# Patient Record
Sex: FEMALE | Race: WHITE | Employment: FULL TIME | URBAN - METROPOLITAN AREA
[De-identification: names, ages, dates, MRNs, and addresses within clinical notes are randomized per-mention and may not be internally consistent; named-entity substitution may affect disease eponyms.]

---

## 2020-01-03 ENCOUNTER — HOSPITAL ENCOUNTER (EMERGENCY)
Age: 30
Discharge: HOME | End: 2020-01-04
Attending: EMERGENCY MEDICINE
Payer: COMMERCIAL

## 2020-01-03 PROCEDURE — 99283 EMERGENCY DEPT VISIT LOW MDM: CPT

## 2020-01-03 RX ORDER — 0.9 % SODIUM CHLORIDE 0.9 %
1000 INTRAVENOUS SOLUTION INTRAVENOUS ONCE
Status: DISCONTINUED | OUTPATIENT
Start: 2020-01-03 | End: 2020-01-04 | Stop reason: HOSPADM

## 2020-01-03 RX ORDER — METOCLOPRAMIDE HYDROCHLORIDE 5 MG/ML
10 INJECTION INTRAMUSCULAR; INTRAVENOUS ONCE
Status: DISCONTINUED | OUTPATIENT
Start: 2020-01-03 | End: 2020-01-03

## 2020-01-03 RX ORDER — DIPHENHYDRAMINE HYDROCHLORIDE 50 MG/ML
50 INJECTION INTRAMUSCULAR; INTRAVENOUS ONCE
Status: DISCONTINUED | OUTPATIENT
Start: 2020-01-03 | End: 2020-01-03

## 2020-01-03 RX ORDER — ACETAMINOPHEN 500 MG
1000 TABLET ORAL ONCE
Status: DISCONTINUED | OUTPATIENT
Start: 2020-01-03 | End: 2020-01-04 | Stop reason: HOSPADM

## 2020-01-03 RX ORDER — DIPHENHYDRAMINE HYDROCHLORIDE 50 MG/ML
25 INJECTION INTRAMUSCULAR; INTRAVENOUS ONCE
Status: DISCONTINUED | OUTPATIENT
Start: 2020-01-03 | End: 2020-01-04 | Stop reason: HOSPADM

## 2020-01-03 RX ORDER — METOCLOPRAMIDE HYDROCHLORIDE 5 MG/ML
10 INJECTION INTRAMUSCULAR; INTRAVENOUS ONCE
Status: DISCONTINUED | OUTPATIENT
Start: 2020-01-03 | End: 2020-01-04 | Stop reason: HOSPADM

## 2020-01-03 ASSESSMENT — PAIN DESCRIPTION - PAIN TYPE: TYPE: ACUTE PAIN

## 2020-01-03 ASSESSMENT — PAIN DESCRIPTION - LOCATION: LOCATION: HEAD

## 2020-01-03 ASSESSMENT — PAIN SCALES - GENERAL: PAINLEVEL_OUTOF10: 10

## 2020-01-03 NOTE — ED PROVIDER NOTES
As triage Physician Assistant, I performed a medical screening history and physical exam on this patient. HISTORY OF PRESENT ILLNESS  Kelly Ly is a 30 y.o. female  who presents with \"a migraine headache\". Patient states has history of migraine headaches. No new nature. Patient is currently pregnant at 15 weeks gestation. No abdominal pain, urinary or vaginal symptoms. PHYSICAL EXAM  BP (!) 126/92   Pulse 91   Temp 98.2 °F (36.8 °C) (Oral)   Resp 18   Ht 5' 3\" (1.6 m)   Wt 180 lb (81.6 kg)   SpO2 99%   BMI 31.89 kg/m²         On exam, the patient appears well-hydrated, well-nourished, and in no acute distress. Mucous membranes are moist. Breathing is unlabored. Skin is dry. Mental status appears normal. Moves all extremities, and is without facial droop. Speech is clear. Any necessary Labs, Imaging, and/or treatment were initiated and patient moved to an emergency department exam room. Comment: Please note this report has been produced using speech recognition software and may contain errors related to that system including errors in grammar, punctuation, and spelling, as well as words and phrases that may be inappropriate. If there are any questions or concerns please feel free to contact the dictating provider for clarification.        Radha Vázquez  01/03/20 2767

## 2020-01-04 VITALS
WEIGHT: 180 LBS | RESPIRATION RATE: 18 BRPM | TEMPERATURE: 98.2 F | HEART RATE: 84 BPM | OXYGEN SATURATION: 100 % | DIASTOLIC BLOOD PRESSURE: 82 MMHG | HEIGHT: 63 IN | SYSTOLIC BLOOD PRESSURE: 123 MMHG | BODY MASS INDEX: 31.89 KG/M2

## 2020-01-04 ASSESSMENT — ENCOUNTER SYMPTOMS
VOMITING: 0
SINUS PRESSURE: 0
WHEEZING: 0
NAUSEA: 0
COUGH: 0
RHINORRHEA: 0
SORE THROAT: 0
ABDOMINAL PAIN: 0
CONSTIPATION: 0
DIARRHEA: 0
SHORTNESS OF BREATH: 0

## 2020-01-04 NOTE — ED PROVIDER NOTES
Emergency Department Encounter    Patient: Angelita Brunson  MRN: [id number]  : 1990  Date of Evaluation: 2020  ED Provider:  [id number] [facility]    Triage Chief Complaint:   Migraine and Emesis (15 weeks pregnant)    HPI:  Angelita Brunson is a 30 y.o. female who is currently 15 weeks pregnant who presents with a headache. Patient does state that she typically does get migraines, and prior to pregnancy was on Topamax for her headaches. This is a typical headache for patient. Patient states this particular headache started last night, is a 7 out of 10, the top of her head that is a dull aching sensation. Denies any phonophobia or photophobia. It is associated with nausea and nonbilious and nonbloody emesis. However patient does note that since she became pregnant she has had nausea and vomiting multiple times a day. She does take Zofran helps the symptoms. Patient denies any vaginal bleeding or vaginal discharge. She has had ultrasound that did confirm intrauterine pregnancy. Other than nausea vomiting patient has had uncomplicated pregnancy. Denies F/C, CP, SOB, palpitations, abdominal pain, dysuria, diarrhea and constipatin. ROS:  Review of Systems   Constitutional: Negative for chills and fever. HENT: Negative for congestion, rhinorrhea, sinus pressure and sore throat. Eyes: Negative for visual disturbance. Respiratory: Negative for cough, shortness of breath and wheezing. Cardiovascular: Negative for chest pain and palpitations. Gastrointestinal: Negative for abdominal pain, constipation, diarrhea, nausea and vomiting. Genitourinary: Negative for dysuria, frequency and urgency. Musculoskeletal: Negative for arthralgias and myalgias. Skin: Negative for rash. Neurological: Positive for headaches. Negative for dizziness and syncope. Psychiatric/Behavioral: Negative for agitation, behavioral problems and confusion.          Past Medical History:   Diagnosis Date No current outpatient medications on file. No Known Allergies    Nursing Notes Reviewed    Physical Exam:  Triage VS:    ED Triage Vitals [01/03/20 1731]   Enc Vitals Group      BP (!) 126/92      Pulse 91      Resp 18      Temp 98.2 °F (36.8 °C)      Temp Source Oral      SpO2 99 %      Weight 180 lb (81.6 kg)      Height 5' 3\" (1.6 m)      Head Circumference       Peak Flow       Pain Score       Pain Loc       Pain Edu? Excl. in [address]? Physical Exam  Vitals signs and nursing note reviewed. Constitutional:       Appearance: Normal appearance. HENT:      Head: Normocephalic and atraumatic. Nose: Nose normal.      Mouth/Throat:      Mouth: Mucous membranes are moist.   Eyes:      Extraocular Movements: Extraocular movements intact. Conjunctiva/sclera: Conjunctivae normal.      Pupils: Pupils are equal, round, and reactive to light. Neck:      Musculoskeletal: Normal range of motion and neck supple. Cardiovascular:      Rate and Rhythm: Normal rate and regular rhythm. Pulses: Normal pulses. Pulmonary:      Effort: Pulmonary effort is normal.      Breath sounds: Normal breath sounds. Abdominal:      General: Abdomen is flat. Bowel sounds are normal.      Palpations: Abdomen is soft. Tenderness: There is no tenderness. There is no guarding or rebound. Musculoskeletal: Normal range of motion. Skin:     General: Skin is warm. Capillary Refill: Capillary refill takes less than 2 seconds. Neurological:      Mental Status: She is alert. GCS: GCS eye subscore is 4. GCS verbal subscore is 5. GCS motor subscore is 6. Cranial Nerves: Cranial nerves are intact. Sensory: Sensation is intact. Motor: Motor function is intact. Coordination: Coordination is intact. Comments: Patient answers all questions and follows all commands appropriately. Speech is fluid. Psychiatric:         Mood and Affect: Mood normal.         Thought Content:  Thought content normal.         Judgment: Judgment normal.              I have reviewed and interpreted all of the currently available lab results from this visit (if applicable):  No results found for this visit on 01/03/20. Radiographs (if obtained):    [] Radiologist's Report Reviewed:  No orders to display           Chart review shows recent radiographs:  No results found. MDM:  Patient seen and examined. She is well-appearing, no acute distress, is neurologically intact. Patient is offered IV fluids, Reglan, Benadryl, Tylenol for her headache, however she refuses IV and IV medications. Patient states that she was hoping for \"a shot that would make the headache go away. \"  I discussed with patient that as she is pregnant, there are no injections that we can give her IM to help her headache, and I did offer her Tylenol. Patient states that she does not want Tylenol as she has Tylenol at home. Patient states that she would like to be discharged home, as she no longer wants to wait, and her headache has improved since when she is got here. Patient does have an appointment with her OB/GYN next week, and agrees to keep this. Patient understands follow-up with primary care provider in 1 to 2 days. Patient to return to emergency department if symptoms worsen. She agrees with plan of care, and all questions are answered. 12:21 AM  Called to patient's bedside at this time, she states she would like to go home. She is offered p.o. Zofran and p.o. Tylenol at this time, she refuses this, and states that she was hoping for a shot. States that she is feeling fine and would like to go home. Patient states that she has not vomited since she has been here, she did eat 2 bags of chips in the waiting room, as she was hungry, and has not vomited them. Clinical Impression:  1. Acute nonintractable headache, unspecified headache type      Disposition referral (if applicable):   Bubba Rodriges MD  [address] [facility] [address] [facility]  [phone]    Schedule an appointment as soon as possible for a visit in 2 days      [facility] Emergency Department  [address] [phone]  Go to   As needed, If symptoms worsen    Disposition medications (if applicable): There are no discharge medications for this patient. Comment: Please note this report has been produced using speech recognition software and may contain errors related to that system including errors in grammar, punctuation, and spelling, as well as words and phrases that may be inappropriate. Efforts were made to edit the dictations.        Otilio Aguillon DO  01/04/20 0400

## 2021-12-09 ENCOUNTER — OFFICE VISIT (OUTPATIENT)
Dept: FAMILY MEDICINE CLINIC | Age: 31
End: 2021-12-09
Payer: COMMERCIAL

## 2021-12-09 ENCOUNTER — HOSPITAL ENCOUNTER (OUTPATIENT)
Age: 31
Setting detail: SPECIMEN
Discharge: HOME | End: 2021-12-09
Payer: COMMERCIAL

## 2021-12-09 VITALS
RESPIRATION RATE: 12 BRPM | OXYGEN SATURATION: 99 % | HEIGHT: 63 IN | WEIGHT: 175 LBS | TEMPERATURE: 97 F | HEART RATE: 80 BPM | BODY MASS INDEX: 31.01 KG/M2

## 2021-12-09 DIAGNOSIS — R05.3 PERSISTENT COUGH: Primary | ICD-10-CM

## 2021-12-09 DIAGNOSIS — J04.0 LARYNGITIS: ICD-10-CM

## 2021-12-09 PROCEDURE — 99213 OFFICE O/P EST LOW 20 MIN: CPT | Performed by: PHYSICIAN ASSISTANT

## 2021-12-09 PROCEDURE — U0005 INFEC AGEN DETEC AMPLI PROBE: HCPCS

## 2021-12-09 PROCEDURE — U0003 INFECTIOUS AGENT DETECTION BY NUCLEIC ACID (DNA OR RNA); SEVERE ACUTE RESPIRATORY SYNDROME CORONAVIRUS 2 (SARS-COV-2) (CORONAVIRUS DISEASE [COVID-19]), AMPLIFIED PROBE TECHNIQUE, MAKING USE OF HIGH THROUGHPUT TECHNOLOGIES AS DESCRIBED BY CMS-2020-01-R: HCPCS

## 2021-12-09 RX ORDER — AZITHROMYCIN 250 MG/1
TABLET, FILM COATED ORAL
Qty: 6 TABLET | Refills: 0 | Status: SHIPPED | OUTPATIENT
Start: 2021-12-09

## 2021-12-09 RX ORDER — BENZONATATE 200 MG/1
200 CAPSULE ORAL 3 TIMES DAILY PRN
Qty: 30 CAPSULE | Refills: 0 | Status: SHIPPED | OUTPATIENT
Start: 2021-12-09 | End: 2021-12-19

## 2021-12-09 RX ORDER — METHYLPREDNISOLONE 4 MG/1
TABLET ORAL
Qty: 1 KIT | Refills: 0 | Status: SHIPPED | OUTPATIENT
Start: 2021-12-09

## 2021-12-09 NOTE — PROGRESS NOTES
12/9/2021    HPI:  Chief complaint and history of present illness as per medical assistant/nurse documented today in the Flu/COVID-19 clinic. Patient presents to the clinic today with complaints of 2 to 3-week history of dry cough, sore throat from coughing, and hoarse voice. She had some postnasal drip the first few days but now resolved. She denies nasal congestion. She denies fever, chills, stridor, dysphagia. She denies heartburn. She denies nausea, vomiting, diarrhea, loss of taste or smell. She states that her wife has also been sick with similar symptoms and now 2 children have a cough as well. No known Covid exposure. She is vaccinated against the virus. She is employed by [employer]. MEDICATIONS:  Prior to Visit Medications    Medication Sig Taking? Authorizing Provider   methylPREDNISolone (MEDROL, YESSICA,) 4 MG tablet Use as directed Yes Mikaela Bennett PA-C   azithromycin (ZITHROMAX) 250 MG tablet Use as directed Yes Mikaela Bnenett PA-C   benzonatate (TESSALON) 200 MG capsule Take 1 capsule by mouth 3 times daily as needed for Cough Yes Mikaela Bennett PA-C       No Known Allergies,   Past Medical History:   Diagnosis Date    Headache        PHYSICAL EXAM:  Physical Exam    Vitals:    12/09/21 1700   Pulse: 80   Resp: 12   Temp: 97 °F (36.1 °C)   SpO2: 99%       Constitutional:  Well developed, well nourished  HENT:  Normocephalic, atraumatic, bilateral external ears normal, bilateral ear canals normal, bilateral TMs normal, oropharynx moist, postnasal drip noted. No petechiae or exudate. Uvula midline and benign and airway patent. Nasal turbinates erythematous and edematous. No obvious nasal congestion.   Eyes:  conjunctiva normal, no discharge, no scleral icterus  Cardiovascular:  Normal heart rate, normal rhythm, no murmurs, gallops or rubs  Thorax & Lungs:  Normal breath sounds, no respiratory distress, no wheezing, no rales, no rhonchi  Skin:  Warm, dry, no erythema, no rash  Neurologic:  Alert & oriented   Psychiatric:  Affect normal, mood normal    ASSESSMENT/PLAN:  1. Persistent cough  - Covid-19 Ambulatory  - azithromycin (ZITHROMAX) 250 MG tablet; Use as directed  Dispense: 6 tablet; Refill: 0  - benzonatate (TESSALON) 200 MG capsule; Take 1 capsule by mouth 3 times daily as needed for Cough  Dispense: 30 capsule; Refill: 0    2. Laryngitis  - Covid-19 Ambulatory  - methylPREDNISolone (MEDROL, YESSICA,) 4 MG tablet; Use as directed  Dispense: 1 kit; Refill: 0    COVID-19 test results pending. No need to quarantine as symptoms have been present for 2 to 3 weeks. No longer considered contagious per CDC. Results may help with contact tracing purposes. We will do a trial of Z-Yessica due to length of symptoms. Medrol Dosepak as directed  Tessalon Perles 3 times daily as needed  Voice rest  Cool mist vaporizer or humidifier  F/U with PCP as needed    FOLLOW-UP:  No follow-ups on file.       Pawan Hanley PA-C

## 2021-12-09 NOTE — PATIENT INSTRUCTIONS
cough that does not produce mucus. · Use saline (saltwater) nasal washes to help keep your nasal passages open and wash out mucus and bacteria. You can buy saline nose drops at a grocery store or drugstore. Or, you can make your own at home by mixing ½ teaspoon salt, 1 cup water (at room temperature), and ½ teaspoon baking soda. If you make your own, fill a bulb syringe with the solution, insert the tip into your nostril, and squeeze gently. Caitie Calhouna your nose. · Follow your doctor's directions for treating the condition that caused you to lose your voice. If your doctor prescribed antibiotics, take them as directed. Do not stop taking them just because you feel better. You need to take the full course of antibiotics. · Do not smoke or allow others to smoke around you. If you need help quitting, talk to your doctor about stop-smoking programs and medicines. These can increase your chances of quitting for good. When should you call for help? Call 911 anytime you think you may need emergency care. For example, call if:    · You have trouble breathing. Call your doctor now or seek immediate medical care if:    · You have new or worse pain.     · You have trouble swallowing. Watch closely for changes in your health, and be sure to contact your doctor if:    · You do not get better as expected. Where can you learn more? Go to [email or web address] and sign in to your [employer] account. Enter I365 in the [facility] box to learn more about \"Laryngitis: Care Instructions. \"     If you do not have an account, please click on the \"Sign Up Now\" link. Current as of: December 2, 2020               Content Version: 13.0  © 2917-7903 [employer], [employer]. Care instructions adapted under license by [facility] ([facility]).  If you have questions about a medical condition or this instruction, always ask your healthcare professional. Maci Teran any warranty or liability for your use of this information.

## 2021-12-10 LAB — SARS-COV-2: NOT DETECTED

## 2022-10-02 NOTE — PROGRESS NOTES
12/9/21  Hudson Aguirre  1990    FLU/COVID-19 CLINIC EVALUATION    HPI SYMPTOMS:    Employer: [employer]    [] Fevers  [] Chills  [x] Cough  [] Coughing up blood  [] Chest Congestion  [] Nasal Congestion  [] Feeling short of breath  [] Sometimes  [] Frequently  [] All the time  [] Chest pain  [] Headaches  []Tolerable  [] Severe  [] Sore throat  [] Muscle aches  [] Nausea  [] Vomiting  []Unable to keep fluids down  [] Diarrhea  []Severe    [x] OTHER SYMPTOMS: Laryngitis     Symptom Duration:   [] 1  [] 2   [] 3   [] 4    [] 5   [] 6   [] 7   [] 8   [] 9   [] 10   [] 11   [] 12   [] 13   [] 14   [x] Longer than 14 days    Symptom course:   [x] Worsening     [] Stable     [] Improving    RISK FACTORS:    [] Pregnant or possibly pregnant  [] Age over 61  [] Diabetes  [] Heart disease  [] Asthma  [] COPD/Other chronic lung diseases  [] Active Cancer  [] On Chemotherapy  [] Taking oral steroids  [] History Lymphoma/Leukemia  [] Close contact with a lab confirmed COVID-19 patient within 14 days of symptom onset  [] History of travel from affected geographical areas within 14 days of symptom onset       VITALS:  There were no vitals filed for this visit. TESTS:    POCT FLU:  [] Positive     []Negative    ASSESSMENT:    [] Flu  [] Possible COVID-19  [] Strep    PLAN:    [] Discharge home with written instructions for:  [] Flu management  [] Possible COVID-19 infection with self-quarantine and management of symptoms  [] Follow-up with primary care physician or emergency department if worsens  [] Evaluation per physician/NP/PA in clinic  [] Sent to ER       An  electronic signature was used to authenticate this note.      --Erika Evangelista LPN on 66/8/6443 at 8:67 PM Dictated